# Patient Record
Sex: FEMALE | Race: WHITE | URBAN - METROPOLITAN AREA
[De-identification: names, ages, dates, MRNs, and addresses within clinical notes are randomized per-mention and may not be internally consistent; named-entity substitution may affect disease eponyms.]

---

## 2017-06-02 ENCOUNTER — DOCTOR'S OFFICE (OUTPATIENT)
Dept: URBAN - METROPOLITAN AREA CLINIC 136 | Facility: CLINIC | Age: 82
Setting detail: OPHTHALMOLOGY
End: 2017-06-02
Payer: COMMERCIAL

## 2017-06-02 DIAGNOSIS — H52.03: ICD-10-CM

## 2017-06-02 DIAGNOSIS — H52.4: ICD-10-CM

## 2017-06-02 DIAGNOSIS — H52.223: ICD-10-CM

## 2017-06-02 PROCEDURE — 92015 DETERMINE REFRACTIVE STATE: CPT | Performed by: OPTOMETRIST

## 2017-06-02 PROCEDURE — 92014 COMPRE OPH EXAM EST PT 1/>: CPT | Performed by: OPTOMETRIST

## 2017-06-02 ASSESSMENT — REFRACTION_OUTSIDERX
OD_VA1: 20/20
OS_CYLINDER: -1.00
OU_VA: 20/20
OD_VA3: 20/
OD_CYLINDER: -1.50
OS_VA1: 20/25-2
OS_SPHERE: +3.00
OD_AXIS: 085
OD_ADD: +3.00
OD_VA2: 20/20
OD_SPHERE: +2.50
OS_VA3: 20/
OS_ADD: +3.00
OS_AXIS: 170
OS_VA2: 20/20

## 2017-06-02 ASSESSMENT — REFRACTION_CURRENTRX
OD_OVR_VA: 20/
OS_ADD: +3.00
OD_CYLINDER: -1.25
OD_SPHERE: +2.50
OS_VPRISM_DIRECTION: PROGS
OS_OVR_VA: 20/
OD_OVR_VA: 20/
OS_OVR_VA: 20/
OD_VPRISM_DIRECTION: PROGS
OS_AXIS: 140
OD_ADD: +3.00
OD_OVR_VA: 20/
OS_SPHERE: +3.75
OD_AXIS: 085
OS_OVR_VA: 20/
OS_CYLINDER: -1.75

## 2017-06-02 ASSESSMENT — KERATOMETRY
OS_AXISANGLE_DEGREES: 102
OD_K2POWER_DIOPTERS: 44.25
OS_K2POWER_DIOPTERS: 43.50
OD_AXISANGLE_DEGREES: 87
OD_K1POWER_DIOPTERS: 43.25
OS_K1POWER_DIOPTERS: 42.75

## 2017-06-02 ASSESSMENT — REFRACTION_MANIFEST
OU_VA: 20/
OD_VA1: 20/
OS_VA3: 20/
OU_VA: 20/
OS_VA2: 20/
OD_VA3: 20/
OD_VA2: 20/
OS_VA2: 20/
OS_VA1: 20/
OD_VA3: 20/
OS_VA1: 20/
OS_VA3: 20/
OD_VA1: 20/
OD_VA2: 20/

## 2017-06-02 ASSESSMENT — SPHEQUIV_DERIVED
OD_SPHEQUIV: 1.5
OS_SPHEQUIV: 2.125

## 2017-06-02 ASSESSMENT — REFRACTION_AUTOREFRACTION
OD_AXIS: 085
OD_SPHERE: +2.25
OS_AXIS: 170
OS_CYLINDER: -1.25
OD_CYLINDER: -1.50
OS_SPHERE: +2.75

## 2017-06-02 ASSESSMENT — CONFRONTATIONAL VISUAL FIELD TEST (CVF)
OD_FINDINGS: FULL
OS_FINDINGS: FULL

## 2017-06-02 ASSESSMENT — VISUAL ACUITY
OS_BCVA: 20/20-2
OD_BCVA: 20/40-

## 2017-06-02 ASSESSMENT — AXIALLENGTH_DERIVED
OD_AL: 22.9345
OS_AL: 22.9214

## 2017-10-13 ENCOUNTER — TRANSCRIBE ORDERS (OUTPATIENT)
Dept: ADMINISTRATIVE | Facility: HOSPITAL | Age: 82
End: 2017-10-13

## 2017-10-13 DIAGNOSIS — Z12.31 ENCOUNTER FOR SCREENING MAMMOGRAM FOR MALIGNANT NEOPLASM OF BREAST: Primary | ICD-10-CM

## 2017-10-24 ENCOUNTER — HOSPITAL ENCOUNTER (OUTPATIENT)
Dept: MAMMOGRAPHY | Facility: HOSPITAL | Age: 82
Discharge: HOME | End: 2017-10-24
Payer: COMMERCIAL

## 2017-10-24 DIAGNOSIS — Z12.31 ENCOUNTER FOR SCREENING MAMMOGRAM FOR MALIGNANT NEOPLASM OF BREAST: ICD-10-CM

## 2017-10-24 PROCEDURE — G0202 SCR MAMMO BI INCL CAD: HCPCS

## 2018-05-27 ENCOUNTER — OFFICE VISIT (OUTPATIENT)
Dept: URGENT CARE | Age: 83
End: 2018-05-27
Payer: COMMERCIAL

## 2018-05-27 VITALS
BODY MASS INDEX: 24.84 KG/M2 | HEIGHT: 62 IN | HEART RATE: 85 BPM | SYSTOLIC BLOOD PRESSURE: 166 MMHG | RESPIRATION RATE: 16 BRPM | TEMPERATURE: 98.1 F | WEIGHT: 135 LBS | DIASTOLIC BLOOD PRESSURE: 82 MMHG | OXYGEN SATURATION: 95 %
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
## 2018-05-27 PROCEDURE — 99203 OFFICE O/P NEW LOW 30 MIN: CPT | Performed by: PHYSICIAN ASSISTANT

## 2018-05-27 RX ORDER — GABAPENTIN 100 MG/1
300 CAPSULE ORAL
COMMUNITY
Start: 2016-10-07

## 2018-05-27 RX ORDER — HYDROCHLOROTHIAZIDE 12.5 MG/1
12.5 CAPSULE, GELATIN COATED ORAL
COMMUNITY
Start: 2018-04-10

## 2018-05-27 RX ORDER — ASPIRIN 81 MG/1
81 TABLET, CHEWABLE ORAL
COMMUNITY
Start: 2011-09-23

## 2018-05-27 RX ORDER — PHENOL 1.4 %
1200 AEROSOL, SPRAY (ML) MUCOUS MEMBRANE
COMMUNITY

## 2018-05-27 RX ORDER — LORATADINE 10 MG/1
CAPSULE, LIQUID FILLED ORAL
COMMUNITY

## 2018-05-27 RX ORDER — CHLORAL HYDRATE 500 MG
1 CAPSULE ORAL
COMMUNITY
Start: 2011-09-23

## 2018-05-27 RX ORDER — ATORVASTATIN CALCIUM 10 MG/1
TABLET, FILM COATED ORAL
COMMUNITY
Start: 2018-05-24

## 2018-05-27 RX ORDER — FLUTICASONE PROPIONATE 50 MCG
2 SPRAY, SUSPENSION (ML) NASAL EVERY 24 HOURS
COMMUNITY
Start: 2013-10-01

## 2018-05-27 RX ORDER — GLIMEPIRIDE 1 MG/1
TABLET ORAL
COMMUNITY
Start: 2018-02-28

## 2018-05-27 NOTE — PROGRESS NOTES
[facility]        NAME: Yossi Dominique is a 81 y o  female  : 1935    MRN: [id number]  DATE: May 27, 2018  TIME: 4:10 PM    Assessment and Plan   Insect bite, initial encounter [W57  XXXA]  1  Insect bite, initial encounter  hydrocortisone (WESTCORT) 0 2 % cream         Patient Instructions     Apply topical corticosteroid cream  Keep area clean and dry  Take over the counter Claritin   Watch for signs of infection  Avoid scratching area  Follow up with PCP in 3-5 days  Proceed to  ER if symptoms worsen  Chief Complaint     Chief Complaint   Patient presents with   [address]     2 bug bites yesterday  pruritus  History of Present Illness       States she was at a wedding yesterday and suspects she was bitten by some bug  States she has bumps over R and L forearm and L shoulder  Insect Bite   This is a new problem  The current episode started yesterday  The problem occurs constantly  The problem has been unchanged  Associated symptoms include a rash  Pertinent negatives include no abdominal pain, anorexia, arthralgias, change in bowel habit, chest pain, chills, congestion, coughing, diaphoresis, fatigue, fever, headaches, joint swelling, myalgias, nausea, neck pain, numbness, sore throat, swollen glands, urinary symptoms, vertigo, visual change, vomiting or weakness  Nothing aggravates the symptoms  Treatments tried: Topical anti-itch cream and neosporin  The treatment provided no relief  Review of Systems   Review of Systems   Constitutional: Negative for chills, diaphoresis, fatigue and fever  HENT: Negative for congestion and sore throat  Respiratory: Negative for cough  Cardiovascular: Negative for chest pain  Gastrointestinal: Negative for abdominal pain, anorexia, change in bowel habit, nausea and vomiting  Musculoskeletal: Negative for arthralgias, joint swelling, myalgias and neck pain  Skin: Positive for rash     Neurological: Negative for vertigo, weakness, numbness and headaches  Current Medications       Current Outpatient Prescriptions:     aspirin 81 mg chewable tablet, Chew 81 mg, Disp: , Rfl:     atorvastatin (LIPITOR) 10 mg tablet, TAKE ONE TABLET BY MOUTH NIGHTLY, Disp: , Rfl:     fluticasone (FLONASE) 50 mcg/act nasal spray, 2 sprays into each nostril every 24 hours, Disp: , Rfl:     gabapentin (NEURONTIN) 100 mg capsule, 300 mg, Disp: , Rfl:     glimepiride (AMARYL) 1 mg tablet, Take 1 in am and 1/2 in pm, Disp: , Rfl:     hydrochlorothiazide (MICROZIDE) 12 5 mg capsule, Take 12 5 mg by mouth, Disp: , Rfl:     metFORMIN (GLUCOPHAGE) 500 mg tablet, Take 1,000 mg by mouth, Disp: , Rfl:     Omega-3 1000 MG CAPS, Take 1 capsule by mouth, Disp: , Rfl:     OMEPRAZOLE PO, Take 20 mg by mouth, Disp: , Rfl:     calcium carbonate (OS-TANVIR) 600 MG tablet, Take 1,200 mg by mouth, Disp: , Rfl:     Cholecalciferol 1000 units capsule, Take 2,000 Units by mouth, Disp: , Rfl:     hydrocortisone (WESTCORT) 0 2 % cream, Apply topically 2 (two) times a day, Disp: 45 g, Rfl: 0    Loratadine 10 MG CAPS, Take by mouth, Disp: , Rfl:     Current Allergies     Allergies as of 05/27/2018 - never reviewed   Allergen Reaction Noted    Penicillins      Shellfish-derived products  05/19/2015    Sulfa antibiotics Rash 05/19/2015    Vancomycin Rash 05/19/2015            The following portions of the patient's history were reviewed and updated as appropriate: allergies, current medications, past family history, past medical history, past social history, past surgical history and problem list      No past medical history on file  No past surgical history on file  No family history on file  Medications have been verified          Objective   /82   Pulse 85   Temp 98 1 °F (36 7 °C)   Resp 16   Ht 5' 2" (1 575 m)   Wt 61 2 kg (135 lb)   SpO2 95%   BMI 24 69 kg/m²        Physical Exam     Physical Exam   Constitutional: She appears well-developed and well-nourished  No distress  Eyes: Pupils are equal, round, and reactive to light  Right eye exhibits no discharge  Cardiovascular: Normal rate, regular rhythm and normal heart sounds  Exam reveals no gallop and no friction rub  No murmur heard  Pulmonary/Chest: Effort normal and breath sounds normal  No respiratory distress  She has no wheezes  She has no rales  She exhibits no tenderness  Neurological: She is alert  Skin: Skin is warm  Rash noted  There is erythema  Two: 1mm papule with surrounding raised 2 5cm induration over R proximal forearm; 1cm over L anterior shoulder  Psychiatric: She has a normal mood and affect   Her behavior is normal  Judgment and thought content normal

## 2018-05-27 NOTE — PATIENT INSTRUCTIONS
Apply topical corticosteroid cream  Keep area clean and dry  Take over the counter Claritin   Watch for signs of infection  Avoid scratching area  Follow up with PCP in 3-5 days  Proceed to  ER if symptoms worsen  Contact Dermatitis   WHAT YOU NEED TO KNOW:   Contact dermatitis is a skin rash  It develops when you touch something that irritates your skin or causes an allergic reaction  DISCHARGE INSTRUCTIONS:   Call 911 for any of the following:   · You have sudden trouble breathing  · Your throat swells and you have trouble eating  · Your face is swollen  Contact your healthcare provider if:   · You have a fever  · Your blisters are draining pus  · Your rash spreads or does not get better, even after treatment  · You have questions or concerns about your condition or care  Medicines:   · Medicines  help decrease itching and swelling  They will be given as a topical medicine to apply to your rash or as a pill  · Take your medicine as directed  Contact your healthcare provider if you think your medicine is not helping or if you have side effects  Tell him or her if you are allergic to any medicine  Keep a list of the medicines, vitamins, and herbs you take  Include the amounts, and when and why you take them  Bring the list or the pill bottles to follow-up visits  Carry your medicine list with you in case of an emergency  Manage contact dermatitis:   · Take short baths or showers in cool water  Use mild soap or soap-free cleansers  Add oatmeal, baking soda, or cornstarch to the bath water to help decrease skin irritation  · Avoid skin irritants , such as makeup, hair products, soaps, and cleansers  Use products that do not contain perfume or dye  · Apply a cool compress to your rash  This will help soothe your skin  · Keep your skin moist   Rub unscented cream or lotion on your skin to prevent dryness and itching   Do this right after a bath or shower when your skin is still damp  Follow up with your healthcare provider or dermatologist in 2 to 3 days:  Write down your questions so you remember to ask them during your visits  © 2017 [address] [employer] Information is for End User's use only and may not be sold, redistributed or otherwise used for commercial purposes  All illustrations and images included in CareNotes® are the copyrighted property of [employer]  or [address]  The above information is an  only  It is not intended as medical advice for individual conditions or treatments  Talk to your doctor, nurse or pharmacist before following any medical regimen to see if it is safe and effective for you

## 2018-06-08 ENCOUNTER — DOCTOR'S OFFICE (OUTPATIENT)
Dept: URBAN - METROPOLITAN AREA CLINIC 136 | Facility: CLINIC | Age: 83
Setting detail: OPHTHALMOLOGY
End: 2018-06-08
Payer: COMMERCIAL

## 2018-06-08 DIAGNOSIS — H52.223: ICD-10-CM

## 2018-06-08 DIAGNOSIS — H52.4: ICD-10-CM

## 2018-06-08 DIAGNOSIS — H52.03: ICD-10-CM

## 2018-06-08 PROCEDURE — 92015 DETERMINE REFRACTIVE STATE: CPT | Performed by: OPTOMETRIST

## 2018-06-08 PROCEDURE — 92014 COMPRE OPH EXAM EST PT 1/>: CPT | Performed by: OPTOMETRIST

## 2018-06-08 ASSESSMENT — REFRACTION_MANIFEST
OD_VA2: 20/
OS_VA1: 20/
OU_VA: 20/
OD_VA3: 20/
OD_VA1: 20/
OS_VA3: 20/
OD_VA3: 20/
OU_VA: 20/
OS_VA3: 20/
OD_VA1: 20/
OS_VA1: 20/
OS_VA2: 20/
OS_VA2: 20/
OD_VA2: 20/

## 2018-06-08 ASSESSMENT — REFRACTION_AUTOREFRACTION
OS_CYLINDER: -1.50
OS_SPHERE: +2.75
OD_CYLINDER: -1.50
OD_SPHERE: +2.50
OD_AXIS: 081
OS_AXIS: 154

## 2018-06-08 ASSESSMENT — REFRACTION_OUTSIDERX
OS_VA3: 20/
OU_VA: 20/20
OS_VA1: 20/25-2
OS_ADD: +3.00
OS_AXIS: 160
OD_VA2: 20/20
OD_CYLINDER: -1.50
OD_VA3: 20/
OS_SPHERE: +3.00
OD_VA1: 20/20
OD_AXIS: 080
OS_CYLINDER: -1.00
OD_SPHERE: +2.50
OD_ADD: +3.00
OS_VA2: 20/20

## 2018-06-08 ASSESSMENT — REFRACTION_CURRENTRX
OD_OVR_VA: 20/
OS_OVR_VA: 20/
OS_AXIS: 140
OD_OVR_VA: 20/
OS_OVR_VA: 20/
OS_OVR_VA: 20/
OD_CYLINDER: -1.25
OS_VPRISM_DIRECTION: PROGS
OD_AXIS: 085
OD_SPHERE: +2.50
OD_VPRISM_DIRECTION: PROGS
OS_SPHERE: +3.75
OD_OVR_VA: 20/
OD_ADD: +3.00
OS_CYLINDER: -1.75
OS_ADD: +3.00

## 2018-06-08 ASSESSMENT — KERATOMETRY
OS_K2POWER_DIOPTERS: 43.50
OS_AXISANGLE_DEGREES: 102
OD_AXISANGLE_DEGREES: 87
OD_K2POWER_DIOPTERS: 44.25
OS_K1POWER_DIOPTERS: 42.75
OD_K1POWER_DIOPTERS: 43.25

## 2018-06-08 ASSESSMENT — CONFRONTATIONAL VISUAL FIELD TEST (CVF)
OS_FINDINGS: FULL
OD_FINDINGS: FULL

## 2018-06-08 ASSESSMENT — SPHEQUIV_DERIVED
OD_SPHEQUIV: 1.75
OS_SPHEQUIV: 2

## 2018-06-08 ASSESSMENT — VISUAL ACUITY
OD_BCVA: 20/30-1
OS_BCVA: 20/30-2

## 2018-06-08 ASSESSMENT — AXIALLENGTH_DERIVED
OS_AL: 22.9674
OD_AL: 22.8428

## 2019-05-29 ENCOUNTER — TRANSCRIBE ORDERS (OUTPATIENT)
Dept: ADMINISTRATIVE | Facility: HOSPITAL | Age: 84
End: 2019-05-29

## 2019-05-29 DIAGNOSIS — Z12.39 BREAST SCREENING, UNSPECIFIED: Primary | ICD-10-CM

## 2019-06-03 ENCOUNTER — HOSPITAL ENCOUNTER (OUTPATIENT)
Dept: MAMMOGRAPHY | Facility: HOSPITAL | Age: 84
Discharge: HOME | End: 2019-06-03
Payer: COMMERCIAL

## 2019-06-03 VITALS — BODY MASS INDEX: 25.76 KG/M2 | HEIGHT: 62 IN | WEIGHT: 140 LBS

## 2019-06-03 DIAGNOSIS — Z12.39 BREAST SCREENING, UNSPECIFIED: ICD-10-CM

## 2019-06-03 PROCEDURE — 77067 SCR MAMMO BI INCL CAD: CPT

## 2019-06-14 ENCOUNTER — DOCTOR'S OFFICE (OUTPATIENT)
Dept: URBAN - METROPOLITAN AREA CLINIC 136 | Facility: CLINIC | Age: 84
Setting detail: OPHTHALMOLOGY
End: 2019-06-14
Payer: MEDICARE

## 2019-06-14 DIAGNOSIS — H25.13: ICD-10-CM

## 2019-06-14 DIAGNOSIS — E11.9: ICD-10-CM

## 2019-06-14 PROCEDURE — 92014 COMPRE OPH EXAM EST PT 1/>: CPT | Performed by: OPTOMETRIST

## 2019-06-14 ASSESSMENT — REFRACTION_MANIFEST
OD_VA3: 20/
OD_VA1: 20/
OS_VA3: 20/
OD_SPHERE: +2.75
OU_VA: 20/
OD_AXIS: 080
OD_ADD: +3.00
OS_CYLINDER: -1.00
OS_VA1: 20/25-2
OD_VA2: 20/
OD_VA3: 20/
OS_ADD: +3.00
OS_SPHERE: +3.00
OD_VA2: 20/20
OS_VA2: 20/20
OS_VA3: 20/
OS_VA1: 20/
OD_VA1: 20/20
OD_CYLINDER: -1.50
OS_AXIS: 160
OS_VA2: 20/
OU_VA: 20/20

## 2019-06-14 ASSESSMENT — REFRACTION_CURRENTRX
OS_OVR_VA: 20/
OD_SPHERE: +2.50
OD_VPRISM_DIRECTION: PROGS
OD_AXIS: 080
OS_SPHERE: +3.25
OS_VPRISM_DIRECTION: PROGS
OS_AXIS: 155
OD_OVR_VA: 20/
OD_OVR_VA: 20/
OS_ADD: +3.00
OD_OVR_VA: 20/
OS_CYLINDER: -1.00
OD_CYLINDER: -1.50
OS_OVR_VA: 20/
OS_OVR_VA: 20/
OD_ADD: +3.00

## 2019-06-14 ASSESSMENT — CONFRONTATIONAL VISUAL FIELD TEST (CVF)
OD_FINDINGS: FULL
OS_FINDINGS: FULL

## 2019-06-14 ASSESSMENT — AXIALLENGTH_DERIVED
OS_AL: 22.7843
OS_AL: 22.604
OD_AL: 22.6617
OD_AL: 22.7519

## 2019-06-14 ASSESSMENT — REFRACTION_AUTOREFRACTION
OD_SPHERE: +3.00
OD_AXIS: 088
OS_CYLINDER: -1.50
OD_CYLINDER: -1.50
OS_SPHERE: +3.75
OS_AXIS: 160

## 2019-06-14 ASSESSMENT — VISUAL ACUITY
OD_BCVA: 20/30-1
OS_BCVA: 20/25+3

## 2019-06-14 ASSESSMENT — KERATOMETRY
OD_K2POWER_DIOPTERS: 44.25
OS_K2POWER_DIOPTERS: 43.50
OS_K1POWER_DIOPTERS: 42.75
OD_K1POWER_DIOPTERS: 43.25
OD_AXISANGLE_DEGREES: 87
OS_AXISANGLE_DEGREES: 102

## 2019-06-14 ASSESSMENT — SPHEQUIV_DERIVED
OS_SPHEQUIV: 3
OD_SPHEQUIV: 2.25
OD_SPHEQUIV: 2
OS_SPHEQUIV: 2.5

## 2020-07-13 ENCOUNTER — DOCTOR'S OFFICE (OUTPATIENT)
Dept: URBAN - METROPOLITAN AREA CLINIC 136 | Facility: CLINIC | Age: 85
Setting detail: OPHTHALMOLOGY
End: 2020-07-13
Payer: MEDICARE

## 2020-07-13 ENCOUNTER — DOCTOR'S OFFICE (OUTPATIENT)
Dept: URBAN - METROPOLITAN AREA CLINIC 136 | Facility: CLINIC | Age: 85
Setting detail: OPHTHALMOLOGY
End: 2020-07-13
Payer: COMMERCIAL

## 2020-07-13 DIAGNOSIS — H52.03: ICD-10-CM

## 2020-07-13 DIAGNOSIS — H52.4: ICD-10-CM

## 2020-07-13 DIAGNOSIS — H52.223: ICD-10-CM

## 2020-07-13 DIAGNOSIS — H35.3131: ICD-10-CM

## 2020-07-13 PROBLEM — E11.9 TYPE 2 DM WITHOUT RETINOPATHY ; BOTH EYES: Status: ACTIVE | Noted: 2017-06-02

## 2020-07-13 PROBLEM — H25.13: Status: ACTIVE | Noted: 2017-06-02

## 2020-07-13 PROCEDURE — 92014 COMPRE OPH EXAM EST PT 1/>: CPT | Performed by: OPTOMETRIST

## 2020-07-13 PROCEDURE — 92015 DETERMINE REFRACTIVE STATE: CPT | Performed by: OPTOMETRIST

## 2020-07-13 PROCEDURE — 92134 CPTRZ OPH DX IMG PST SGM RTA: CPT | Performed by: OPTOMETRIST

## 2020-07-13 ASSESSMENT — KERATOMETRY
OD_AXISANGLE_DEGREES: 87
OS_AXISANGLE_DEGREES: 102
OS_K2POWER_DIOPTERS: 43.50
OS_K1POWER_DIOPTERS: 42.75
OD_K1POWER_DIOPTERS: 43.25
OD_K2POWER_DIOPTERS: 44.25

## 2020-07-13 ASSESSMENT — REFRACTION_CURRENTRX
OD_OVR_VA: 20/
OD_CYLINDER: -1.50
OS_SPHERE: +3.00
OS_ADD: +3.00
OS_CYLINDER: -1.00
OS_OVR_VA: 20/
OS_AXIS: 160
OD_SPHERE: +2.75
OD_ADD: +3.00
OD_VPRISM_DIRECTION: PROGS
OS_VPRISM_DIRECTION: PROGS
OD_AXIS: 080

## 2020-07-13 ASSESSMENT — AXIALLENGTH_DERIVED
OD_AL: 22.7519
OD_AL: 22.7067
OS_AL: 22.4707
OS_AL: 22.604

## 2020-07-13 ASSESSMENT — REFRACTION_AUTOREFRACTION
OD_SPHERE: +2.75
OD_CYLINDER: -1.25
OS_SPHERE: +4.00
OS_CYLINDER: -1.25
OD_AXIS: 090
OS_AXIS: 153

## 2020-07-13 ASSESSMENT — CONFRONTATIONAL VISUAL FIELD TEST (CVF)
OS_FINDINGS: FULL
OD_FINDINGS: FULL

## 2020-07-13 ASSESSMENT — REFRACTION_MANIFEST
OS_ADD: +3.00
OD_VA2: 20/20
OD_CYLINDER: -1.50
OD_ADD: +3.00
OS_CYLINDER: -1.00
OD_AXIS: 080
OS_VA2: 20/20
OU_VA: 20/20
OS_VA1: 20/30
OD_VA1: 20/20
OS_AXIS: 160
OS_SPHERE: +3.50
OD_SPHERE: +2.75

## 2020-07-13 ASSESSMENT — VISUAL ACUITY
OS_BCVA: 20/25-2
OD_BCVA: 20/50

## 2020-07-13 ASSESSMENT — SPHEQUIV_DERIVED
OS_SPHEQUIV: 3.375
OD_SPHEQUIV: 2.125
OD_SPHEQUIV: 2
OS_SPHEQUIV: 3

## 2021-03-31 ENCOUNTER — IMMUNIZATIONS (OUTPATIENT)
Dept: FAMILY MEDICINE CLINIC | Facility: HOSPITAL | Age: 86
End: 2021-03-31

## 2021-03-31 DIAGNOSIS — Z23 ENCOUNTER FOR IMMUNIZATION: Primary | ICD-10-CM

## 2021-03-31 PROCEDURE — 91300 SARS-COV-2 / COVID-19 MRNA VACCINE (PFIZER-BIONTECH) 30 MCG: CPT

## 2021-03-31 PROCEDURE — 0001A SARS-COV-2 / COVID-19 MRNA VACCINE (PFIZER-BIONTECH) 30 MCG: CPT

## 2021-04-21 ENCOUNTER — IMMUNIZATIONS (OUTPATIENT)
Dept: FAMILY MEDICINE CLINIC | Facility: HOSPITAL | Age: 86
End: 2021-04-21

## 2021-04-21 DIAGNOSIS — Z23 ENCOUNTER FOR IMMUNIZATION: Primary | ICD-10-CM

## 2021-04-21 PROCEDURE — 0002A SARS-COV-2 / COVID-19 MRNA VACCINE (PFIZER-BIONTECH) 30 MCG: CPT

## 2021-04-21 PROCEDURE — 91300 SARS-COV-2 / COVID-19 MRNA VACCINE (PFIZER-BIONTECH) 30 MCG: CPT

## 2024-12-11 ENCOUNTER — APPOINTMENT (OUTPATIENT)
Dept: LAB | Facility: CLINIC | Age: 89
End: 2024-12-11
Payer: COMMERCIAL

## 2024-12-11 DIAGNOSIS — I10 ESSENTIAL HYPERTENSION, MALIGNANT: ICD-10-CM

## 2024-12-11 LAB
ANION GAP SERPL CALCULATED.3IONS-SCNC: 9 MMOL/L (ref 4–13)
BUN SERPL-MCNC: 23 MG/DL (ref 5–25)
CALCIUM SERPL-MCNC: 9 MG/DL (ref 8.4–10.2)
CHLORIDE SERPL-SCNC: 104 MMOL/L (ref 96–108)
CO2 SERPL-SCNC: 24 MMOL/L (ref 21–32)
CREAT SERPL-MCNC: 0.79 MG/DL (ref 0.6–1.3)
GFR SERPL CREATININE-BSD FRML MDRD: 67 ML/MIN/1.73SQ M
GLUCOSE P FAST SERPL-MCNC: 85 MG/DL (ref 65–99)
POTASSIUM SERPL-SCNC: 4.2 MMOL/L (ref 3.5–5.3)
SODIUM SERPL-SCNC: 137 MMOL/L (ref 135–147)

## 2024-12-11 PROCEDURE — 36415 COLL VENOUS BLD VENIPUNCTURE: CPT

## 2024-12-11 PROCEDURE — 80048 BASIC METABOLIC PNL TOTAL CA: CPT

## 2025-05-06 ENCOUNTER — OFFICE VISIT (OUTPATIENT)
Dept: AUDIOLOGY | Age: 89
End: 2025-05-06
Payer: COMMERCIAL

## 2025-05-06 DIAGNOSIS — R42 DIZZINESS AND GIDDINESS: Primary | ICD-10-CM

## 2025-05-06 PROCEDURE — 92537 CALORIC VSTBLR TEST W/REC: CPT | Performed by: AUDIOLOGIST

## 2025-05-06 PROCEDURE — 92540 BASIC VESTIBULAR EVALUATION: CPT | Performed by: AUDIOLOGIST

## 2025-05-06 PROCEDURE — 92567 TYMPANOMETRY: CPT | Performed by: AUDIOLOGIST

## 2025-05-06 NOTE — PROGRESS NOTES
Videonystagmography (VNG) Evaluation    Name:  Olesya Baez  :  1935  Age:  89 y.o.  MRN:  [id number]  Date of Evaluation: 25     HISTORY:     Reason for visit:  Lightheadedness, Pressure in head and ears    Olesya Baez is seen today at the request of Dr. Cordova for VNG testing. Olesya was accompanied by her  niece   Ana to today's visit.. Today, Olesya reported that onset of symptoms began on 3/2/25 following a fall where she hit her chin with force. The current symptoms are described as persistent in frequency. Dizziness perception is described as a(n) imbalanced as well as lightheaded sensation. Associated symptoms include persistent pressure in her forehead, under her eyes, and behind her ears.  Symptoms are exacerbated by bending down. Symptom duration was noted to typically last without relief before subsiding. Episode frequency occurs on a daily basis.     EVALUATION:    Otoscopic Evaluation:   Right Ear: Unremarkable, canal clear   Left Ear: Unremarkable, canal clear    Tympanometry:   Right: Type A; normal middle ear pressure and static compliance    Left: Type A; normal middle ear pressure and static compliance     Oculomotor battery:   Gaze:   Right: Within normal limits  Left: Within normal limits  Up: Within normal limits  Down: Within normal limits      Tracking: Abnormal: Gain Left 0.4 Hz to 0.5 Hz, Gain Right 0.5 Hz     Saccades: Within normal limits     Optokinetic: Within normal limits    Positioning/Positionals:     [place]:    Right: Negative 17 degrees downbeating nystagmus with head down and sitting up , no dizziness reported.    Left: Negative. 3 degrees rightbeating nystagmus and 6 degrees downbeating nystagmus with head down and sitting up. Swaying and reported dizziness with sitting up.       Positionals:   Sitting: Within normal limits   Supine:  2 degrees rightbeating and 2 degrees downbeating nystagmus, suppressed with fixation    Head Right: 2 degrees  leftbeating and 6 degrees downbeating nystagmus, suppressed with fixation    Head Left:  3 degrees rightbeating and 3 degrees downbeating nystagmus, suppressed with fixation    Body Right:  5 degrees downbeating nystagmus, suppressed with fixation    Body Left:  5 degreees downbeating nystagmus, suppressed with fixation       30 degrees Supine:  2 degrees downbeting nystagmus, suppressed with fixation    Calorics: (Normal response <25% difference)    Bithermal Caloric Irrigation: Within normal limits.     Caloric irrigations  completed without incident with good parting otoscopy noted.       IMPRESSIONS:     Abnormal: Central  Central findings include persistent positional downbeating nystagmus as well as ageotropic nystagmus present in 3/6 positions.    There are no clinically significant peripheral findings.    RECOMMENDATIONS:     1) Follow-up with referring provider to review today's results.  2) Continue to monitor dizziness symptoms. If symptoms worsen or fail to improve prior to follow-up with their referring provider, contact your primary care/or referring provider and/or urgent medical attention should be considered.  3) Fall precautions were discussed at length with the patient. Most test effects are expected to subside shortly after testing is completed, it was recommended that they use caution moving around for the remainder of the day.   4) Consider vestibular physical therapy evaluation and rehabilitation through [facility]  5) Consider neurology consult/neurological work-up      Phoenix Barnes., CCC-A  Clinical Audiologist  [facility]  [address] [id number]